# Patient Record
Sex: FEMALE | Race: BLACK OR AFRICAN AMERICAN | URBAN - METROPOLITAN AREA
[De-identification: names, ages, dates, MRNs, and addresses within clinical notes are randomized per-mention and may not be internally consistent; named-entity substitution may affect disease eponyms.]

---

## 2022-01-06 ENCOUNTER — OFFICE VISIT (OUTPATIENT)
Dept: URBAN - METROPOLITAN AREA CLINIC 92 | Facility: CLINIC | Age: 67
End: 2022-01-06
Payer: MEDICARE

## 2022-01-06 ENCOUNTER — LAB OUTSIDE AN ENCOUNTER (OUTPATIENT)
Dept: URBAN - METROPOLITAN AREA CLINIC 92 | Facility: CLINIC | Age: 67
End: 2022-01-06

## 2022-01-06 ENCOUNTER — WEB ENCOUNTER (OUTPATIENT)
Dept: URBAN - METROPOLITAN AREA CLINIC 92 | Facility: CLINIC | Age: 67
End: 2022-01-06

## 2022-01-06 VITALS
HEART RATE: 76 BPM | HEIGHT: 69 IN | WEIGHT: 170 LBS | DIASTOLIC BLOOD PRESSURE: 85 MMHG | TEMPERATURE: 97.6 F | BODY MASS INDEX: 25.18 KG/M2 | SYSTOLIC BLOOD PRESSURE: 135 MMHG

## 2022-01-06 DIAGNOSIS — R11.0 NAUSEA: ICD-10-CM

## 2022-01-06 DIAGNOSIS — R10.32 LEFT LOWER QUADRANT ABDOMINAL PAIN: ICD-10-CM

## 2022-01-06 PROCEDURE — 99204 OFFICE O/P NEW MOD 45 MIN: CPT | Performed by: INTERNAL MEDICINE

## 2022-01-06 NOTE — HPI-TODAY'S VISIT:
This is a 66-year-old female referred by Dr. Yumiko Everett for GI consultation and a copy of this note be sent to the referring provider.  Patient had a directly scheduled colonoscopy by Dr. Piedra on January 6, 2020 for personal history of polyps.  This exam revealed internal hemorrhoids, multiple pandiverticulosis, 3 mm ascending colon polyp that was removed.  Dr. Piedra recommended repeat exam in 5 years.  Pathology of the polyp revealed a tubular adenoma. Pt here for nausea since Sept or October. Pt is caring for her mom so stress related. Pt deneis dysphagia, vomiting or gerd issues. Pt says nausea was coming and going. Pt does have some constipation issues and goes every 2-3 days. Pt was given linzess by her pmd and took it for 10 days or so. Pt has not had diverticulitis in the past. Pt did have hx of endometriosis and scar tissue in the pats. Pt stopped linzess a week ago and is moving her bowels. Pt had no further nausea since moving her bowels.

## 2022-02-16 ENCOUNTER — TELEPHONE ENCOUNTER (OUTPATIENT)
Dept: URBAN - METROPOLITAN AREA CLINIC 92 | Facility: CLINIC | Age: 67
End: 2022-02-16

## 2022-02-16 ENCOUNTER — LAB OUTSIDE AN ENCOUNTER (OUTPATIENT)
Dept: URBAN - METROPOLITAN AREA CLINIC 92 | Facility: CLINIC | Age: 67
End: 2022-02-16

## 2022-02-19 LAB
BASO (ABSOLUTE): 0.1
BASOS: 1
EOS (ABSOLUTE): 0
EOS: 0
HEMATOCRIT: 41.4
HEMATOLOGY COMMENTS:: (no result)
HEMOGLOBIN: 13.7
IMMATURE CELLS: (no result)
IMMATURE GRANS (ABS): 0
IMMATURE GRANULOCYTES: 0
LYMPHS (ABSOLUTE): 1.4
LYMPHS: 29
MCH: 30.4
MCHC: 33.1
MCV: 92
MONOCYTES(ABSOLUTE): 0.3
MONOCYTES: 6
NEUTROPHILS (ABSOLUTE): 3.2
NEUTROPHILS: 64
NRBC: (no result)
PLATELETS: 292
RBC: 4.51
RDW: 13.1
WBC: 5

## 2022-02-22 ENCOUNTER — LAB OUTSIDE AN ENCOUNTER (OUTPATIENT)
Dept: URBAN - METROPOLITAN AREA CLINIC 96 | Facility: CLINIC | Age: 67
End: 2022-02-22

## 2022-02-23 ENCOUNTER — TELEPHONE ENCOUNTER (OUTPATIENT)
Dept: URBAN - METROPOLITAN AREA CLINIC 92 | Facility: CLINIC | Age: 67
End: 2022-02-23

## 2022-02-24 LAB
CALPROTECTIN, FECAL: 17
GASTROINTESTINAL PATHOGEN: (no result)

## 2022-03-01 ENCOUNTER — OFFICE VISIT (OUTPATIENT)
Dept: URBAN - METROPOLITAN AREA TELEHEALTH 2 | Facility: TELEHEALTH | Age: 67
End: 2022-03-01
Payer: MEDICARE

## 2022-03-01 DIAGNOSIS — N28.9 KIDNEY LESION, NATIVE, LEFT: ICD-10-CM

## 2022-03-01 DIAGNOSIS — R19.7 DIARRHEA, UNSPECIFIED TYPE: ICD-10-CM

## 2022-03-01 DIAGNOSIS — R93.89 ABNORMAL CT SCAN: ICD-10-CM

## 2022-03-01 DIAGNOSIS — R11.0 NAUSEA: ICD-10-CM

## 2022-03-01 PROCEDURE — 99214 OFFICE O/P EST MOD 30 MIN: CPT | Performed by: INTERNAL MEDICINE

## 2022-03-01 PROCEDURE — 99204 OFFICE O/P NEW MOD 45 MIN: CPT | Performed by: INTERNAL MEDICINE

## 2022-03-01 RX ORDER — FAMOTIDINE 40 MG/1
1 TABLET AT BEDTIME TABLET, FILM COATED ORAL ONCE A DAY
Qty: 30 | Refills: 1 | OUTPATIENT
Start: 2022-03-01

## 2022-03-01 NOTE — HPI-TODAY'S VISIT:
This is a 66-year-old female referred by Dr. Yumiko Everett for GI fu and a copy of this note be sent to the referring provider.  Patient had a directly scheduled colonoscopy by Dr. Piedra on January 6, 2020 for personal history of polyps.  This exam revealed internal hemorrhoids, multiple pandiverticulosis, 3 mm ascending colon polyp that was removed.  Dr. Piedra recommended repeat exam in 5 years.  Pathology of the polyp revealed a tubular adenoma. Pt saw me for nausea since Sept or October of 2021. Pt was caring for her mom so stress related. Pt denied dysphagia, vomiting or gerd issues. Pt said nausea was coming and going. Pt did have some constipation issues and went every 2-3 days. Pt was given linzess by her pmd and took it for 10 days or so. Pt has not had diverticulitis in the past. Pt did have hx of endometriosis and scar tissue in the pats. Pt stopped linzess prior to seeing me.  Pt had no further nausea since moving her bowels. I had ordered a CT scan when she was last seen for c/o abdominal pain. Patient had a CT scan done on February 11, 2022 and this revealed an indeterminate left renal lesion with soft tissue density.  Further evaluation with an MRI was suggested so I did order the test but she has not done that yet.  There was also some fatty liver and colonic diverticulosis. Pt says the nausea is better but not gone. Pt feels this more when she has not eaten. She denies vomiting. Pt denies gerd. Pt is dieting to lose weight so lost 5lbs on purpose.

## 2022-04-11 ENCOUNTER — LAB OUTSIDE AN ENCOUNTER (OUTPATIENT)
Dept: URBAN - METROPOLITAN AREA CLINIC 124 | Facility: CLINIC | Age: 67
End: 2022-04-11

## 2022-04-11 ENCOUNTER — OFFICE VISIT (OUTPATIENT)
Dept: URBAN - METROPOLITAN AREA CLINIC 124 | Facility: CLINIC | Age: 67
End: 2022-04-11
Payer: MEDICARE

## 2022-04-11 ENCOUNTER — TELEPHONE ENCOUNTER (OUTPATIENT)
Dept: URBAN - METROPOLITAN AREA CLINIC 92 | Facility: CLINIC | Age: 67
End: 2022-04-11

## 2022-04-11 DIAGNOSIS — R93.89 ABNORMAL CT SCAN: ICD-10-CM

## 2022-04-11 DIAGNOSIS — R11.0 NAUSEA: ICD-10-CM

## 2022-04-11 DIAGNOSIS — N28.89 LEFT KIDNEY MASS: ICD-10-CM

## 2022-04-11 PROCEDURE — 99204 OFFICE O/P NEW MOD 45 MIN: CPT | Performed by: INTERNAL MEDICINE

## 2022-04-11 RX ORDER — FAMOTIDINE 40 MG/1
1 TABLET AT BEDTIME TABLET, FILM COATED ORAL ONCE A DAY
Qty: 30 | Refills: 1 | Status: ACTIVE | COMMUNITY
Start: 2022-03-01

## 2022-04-11 RX ORDER — FAMOTIDINE 40 MG/1
1 TABLET AT BEDTIME TABLET, FILM COATED ORAL ONCE A DAY
Qty: 30 | Refills: 1 | OUTPATIENT

## 2022-04-11 NOTE — HPI-TODAY'S VISIT:
This is a 66-year-old female referred by Dr. Yumiko Everett for GI fu and a copy of this note be sent to the referring provider.  Patient had a directly scheduled colonoscopy by Dr. Piedra on January 6, 2020 for personal history of polyps.  This exam revealed internal hemorrhoids, multiple pandiverticulosis, 3 mm ascending colon polyp that was removed.  Dr. Piedra recommended repeat exam in 5 years.  Pathology of the polyp revealed a tubular adenoma. Pt saw me for nausea since Sept or October of 2021. Pt was caring for her mom so stress related. Pt denied dysphagia, vomiting or gerd issues. Pt said nausea was coming and going. Pt did have some constipation issues and went every 2-3 days. Pt was given linzess by her pmd and took it for 10 days or so. Pt has not had diverticulitis in the past. Pt did have hx of endometriosis and scar tissue in the pats. Pt stopped linzess prior to seeing me.  Pt had no further nausea since moving her bowels. I had ordered a CT scan when she was last seen for c/o abdominal pain. Patient had a CT scan done on February 11, 2022 and this revealed an indeterminate left renal lesion with soft tissue density.  Further evaluation with an MRI was suggested so I did order the test but she has not done that yet.  There was also some fatty liver and colonic diverticulosis. Pt says the nausea is better but not gone. Pt feels this more when she has not eaten. She denies vomiting. Pt denies gerd. Pt is dieting to lose weight so lost 5lbs on purpose. I ordered an MRi and it was done on 4/4/22- lt upper renal pole mass (1.4cm) suspicious for renal cell carcinoma, no evidence of met disease. I set up a tele to go over with her. Pt is doing better with famotidine and it is helping. NO dysphagia or n/v. She found a urologist- Dr Donnie Bowden [phone]. [facility]- fax [phone]. Pt has gained some weight and does eat some junk food so has to work on this. No vomiting.

## 2022-05-03 ENCOUNTER — TELEPHONE ENCOUNTER (OUTPATIENT)
Dept: URBAN - METROPOLITAN AREA CLINIC 105 | Facility: CLINIC | Age: 67
End: 2022-05-03

## 2022-05-03 RX ORDER — FAMOTIDINE 40 MG/1
ONE TABLET TABLET, FILM COATED ORAL ONCE A DAY
Qty: 30 | Refills: 3 | OUTPATIENT

## 2022-05-10 ENCOUNTER — TELEPHONE ENCOUNTER (OUTPATIENT)
Dept: URBAN - METROPOLITAN AREA CLINIC 92 | Facility: CLINIC | Age: 67
End: 2022-05-10

## 2022-05-10 RX ORDER — FAMOTIDINE 40 MG/1
1 TABLET AT BEDTIME TABLET, FILM COATED ORAL ONCE A DAY
Qty: 90 TABLET | Refills: 3 | OUTPATIENT
Start: 2022-05-10

## 2022-05-19 ENCOUNTER — OFFICE VISIT (OUTPATIENT)
Dept: URBAN - METROPOLITAN AREA SURGERY CENTER 16 | Facility: SURGERY CENTER | Age: 67
End: 2022-05-19
Payer: MEDICARE

## 2022-05-19 ENCOUNTER — CLAIMS CREATED FROM THE CLAIM WINDOW (OUTPATIENT)
Dept: URBAN - METROPOLITAN AREA CLINIC 4 | Facility: CLINIC | Age: 67
End: 2022-05-19
Payer: MEDICARE

## 2022-05-19 DIAGNOSIS — R10.13 ABDOMINAL DISCOMFORT, EPIGASTRIC: ICD-10-CM

## 2022-05-19 DIAGNOSIS — K22.8 OTHER SPECIFIED DISEASES OF ESOPHAGUS: ICD-10-CM

## 2022-05-19 DIAGNOSIS — K63.89 CYST OF DUODENUM: ICD-10-CM

## 2022-05-19 DIAGNOSIS — K31.89 FOCAL FOVEOLAR HYPERPLASIA: ICD-10-CM

## 2022-05-19 DIAGNOSIS — R11.0 AM NAUSEA: ICD-10-CM

## 2022-05-19 DIAGNOSIS — K31.89 ACQUIRED DEFORMITY OF DUODENUM: ICD-10-CM

## 2022-05-19 DIAGNOSIS — K29.70 GASTRITIS, UNSPECIFIED, WITHOUT BLEEDING: ICD-10-CM

## 2022-05-19 PROCEDURE — 88305 TISSUE EXAM BY PATHOLOGIST: CPT | Performed by: PATHOLOGY

## 2022-05-19 PROCEDURE — G8907 PT DOC NO EVENTS ON DISCHARG: HCPCS | Performed by: INTERNAL MEDICINE

## 2022-05-19 PROCEDURE — 88312 SPECIAL STAINS GROUP 1: CPT | Performed by: PATHOLOGY

## 2022-05-19 PROCEDURE — 43239 EGD BIOPSY SINGLE/MULTIPLE: CPT | Performed by: INTERNAL MEDICINE

## 2022-06-02 ENCOUNTER — LAB OUTSIDE AN ENCOUNTER (OUTPATIENT)
Dept: URBAN - METROPOLITAN AREA TELEHEALTH 2 | Facility: TELEHEALTH | Age: 67
End: 2022-06-02

## 2022-06-02 ENCOUNTER — DASHBOARD ENCOUNTERS (OUTPATIENT)
Age: 67
End: 2022-06-02

## 2022-06-02 PROBLEM — 129679001: Status: ACTIVE | Noted: 2022-02-16

## 2022-06-03 ENCOUNTER — OFFICE VISIT (OUTPATIENT)
Dept: URBAN - METROPOLITAN AREA TELEHEALTH 2 | Facility: TELEHEALTH | Age: 67
End: 2022-06-03
Payer: MEDICARE

## 2022-06-03 VITALS
HEIGHT: 69 IN | TEMPERATURE: 97.6 F | WEIGHT: 165 LBS | SYSTOLIC BLOOD PRESSURE: 135 MMHG | BODY MASS INDEX: 24.44 KG/M2 | DIASTOLIC BLOOD PRESSURE: 85 MMHG | HEART RATE: 76 BPM

## 2022-06-03 DIAGNOSIS — Z86.010 PERSONAL HISTORY OF COLONIC POLYPS: ICD-10-CM

## 2022-06-03 DIAGNOSIS — R10.13 DYSPEPSIA: ICD-10-CM

## 2022-06-03 DIAGNOSIS — N28.89 LEFT KIDNEY MASS: ICD-10-CM

## 2022-06-03 PROBLEM — 309088003: Status: ACTIVE | Noted: 2022-04-11

## 2022-06-03 PROBLEM — 428283002: Status: ACTIVE | Noted: 2022-06-02

## 2022-06-03 PROCEDURE — 99214 OFFICE O/P EST MOD 30 MIN: CPT | Performed by: INTERNAL MEDICINE

## 2022-06-03 PROCEDURE — 99204 OFFICE O/P NEW MOD 45 MIN: CPT | Performed by: INTERNAL MEDICINE

## 2022-06-03 RX ORDER — FAMOTIDINE 40 MG/1
1 TABLET AT BEDTIME TABLET, FILM COATED ORAL ONCE A DAY
Qty: 90 TABLET | Refills: 3 | Status: ACTIVE | COMMUNITY
Start: 2022-05-10

## 2022-06-03 RX ORDER — FAMOTIDINE 40 MG/1
1 TABLET AT BEDTIME TABLET, FILM COATED ORAL ONCE A DAY
Qty: 30 | Refills: 1 | OUTPATIENT

## 2022-06-03 NOTE — HPI-TODAY'S VISIT:
This is a 66-year-old female referred by Dr. Yumiko Everett for GI fu and a copy of this note be sent to the referring provider.  Patient had a directly scheduled colonoscopy by Dr. Piedra on January 6, 2020 for personal history of polyps.  This exam revealed internal hemorrhoids, multiple pandiverticulosis, 3 mm ascending colon polyp that was removed.  Dr. Piedra recommended repeat exam in 5 years.  Pathology of the polyp revealed a tubular adenoma. Pt saw me for nausea since Sept or October of 2021. Pt was caring for her mom so stress related. Pt denied dysphagia, vomiting or gerd issues. Pt said nausea was coming and going. Pt did have some constipation issues and went every 2-3 days. Pt was given linzess by her pmd and took it for 10 days or so. Pt has not had diverticulitis in the past. Pt did have hx of endometriosis and scar tissue in the pats. Pt stopped linzess prior to seeing me.  Pt had no further nausea since moving her bowels. I had ordered a CT scan when she was last seen for c/o abdominal pain. Patient had a CT scan done on February 11, 2022 and this revealed an indeterminate left renal lesion with soft tissue density.  Further evaluation with an MRI was suggested so I did order the test but she has not done that yet.  There was also some fatty liver and colonic diverticulosis. Pt says the nausea is better but not gone. Pt feels this more when she has not eaten. She denies vomiting. Pt denies gerd. Pt is dieting to lose weight so lost 5lbs on purpose. I ordered an MRi and it was done on 4/4/22- lt upper renal pole mass (1.4cm) suspicious for renal cell carcinoma, no evidence of met disease. I set up a tele to go over with her. Pt is doing better with famotidine and it is helping. NO dysphagia or n/v. She found a urologist- Dr Donnie Bowden [phone]. [facility]- fax [phone]. Pt has gained some weight and does eat some junk food so has to work on this. No vomiting.  Patient had an EGD on May 19, 2022 for evaluation of nausea and epigastric abdominal pain.  This exam revealed no gross abnormalities but biopsies were sent.  Biopsy showed no evidence of celiac, mild reactive gastropathy of the stomach but no H. pylori.  Esophageal biopsies were normal. Pt was drinking lemon water and stopped this and it feeling fine. Pt using famotidine prn. Pt has appt next week to see a different urologist that was ref by the first urologist he saw- Dr Navarro- urology at [facility].

## 2022-09-13 ENCOUNTER — TELEPHONE ENCOUNTER (OUTPATIENT)
Dept: URBAN - METROPOLITAN AREA CLINIC 92 | Facility: CLINIC | Age: 67
End: 2022-09-13

## 2022-09-13 RX ORDER — FAMOTIDINE 40 MG/1
1 TABLET TABLET, FILM COATED ORAL ONCE A DAY
Qty: 90 TABLET | Refills: 1 | OUTPATIENT
Start: 2022-09-14

## 2025-08-07 ENCOUNTER — OFFICE VISIT (OUTPATIENT)
Dept: URBAN - METROPOLITAN AREA CLINIC 92 | Facility: CLINIC | Age: 70
End: 2025-08-07
Payer: MEDICARE

## 2025-08-07 ENCOUNTER — LAB OUTSIDE AN ENCOUNTER (OUTPATIENT)
Dept: URBAN - METROPOLITAN AREA CLINIC 92 | Facility: CLINIC | Age: 70
End: 2025-08-07

## 2025-08-07 DIAGNOSIS — R10.13 DYSPEPSIA: ICD-10-CM

## 2025-08-07 DIAGNOSIS — Z90.710 HISTORY OF HYSTERECTOMY: ICD-10-CM

## 2025-08-07 DIAGNOSIS — N28.89 LEFT KIDNEY MASS: ICD-10-CM

## 2025-08-07 DIAGNOSIS — Z86.0101 PERSONAL HISTORY OF ADENOMATOUS AND SERRATED COLON POLYPS: ICD-10-CM

## 2025-08-07 PROCEDURE — 99204 OFFICE O/P NEW MOD 45 MIN: CPT | Performed by: INTERNAL MEDICINE

## 2025-08-07 RX ORDER — ONDANSETRON 4 MG/1
2 TABLETS TABLET, FILM COATED ORAL
Qty: 2 | Refills: 0 | OUTPATIENT
Start: 2025-08-07

## 2025-08-07 RX ORDER — SODIUM, POTASSIUM,MAG SULFATES 17.5-3.13G
AS DIRECTED SOLUTION, RECONSTITUTED, ORAL ORAL AS DIRECTED
Qty: 354 ML | Refills: 0 | OUTPATIENT
Start: 2025-08-07 | End: 2025-08-08